# Patient Record
Sex: FEMALE | Race: WHITE | URBAN - METROPOLITAN AREA
[De-identification: names, ages, dates, MRNs, and addresses within clinical notes are randomized per-mention and may not be internally consistent; named-entity substitution may affect disease eponyms.]

---

## 2019-11-13 ENCOUNTER — OFFICE VISIT (OUTPATIENT)
Dept: SURGERY | Age: 54
End: 2019-11-13

## 2019-11-13 VITALS — HEIGHT: 69 IN | WEIGHT: 281 LBS | BODY MASS INDEX: 41.62 KG/M2

## 2019-11-13 DIAGNOSIS — R23.4 BREAST SKIN CHANGES: Primary | ICD-10-CM

## 2019-11-13 RX ORDER — LISINOPRIL 20 MG/1
TABLET ORAL
Refills: 0 | COMMUNITY
Start: 2019-11-07

## 2019-11-13 RX ORDER — IPRATROPIUM BROMIDE AND ALBUTEROL SULFATE 2.5; .5 MG/3ML; MG/3ML
SOLUTION RESPIRATORY (INHALATION) AS NEEDED
Refills: 0 | COMMUNITY
Start: 2019-10-10

## 2019-11-13 RX ORDER — ALBUTEROL SULFATE 90 UG/1
AEROSOL, METERED RESPIRATORY (INHALATION)
Refills: 0 | COMMUNITY
Start: 2019-10-10

## 2019-11-13 RX ORDER — CLINDAMYCIN HYDROCHLORIDE 300 MG/1
300 CAPSULE ORAL 3 TIMES DAILY
Qty: 30 CAP | Refills: 0 | Status: SHIPPED | OUTPATIENT
Start: 2019-11-13 | End: 2019-11-23

## 2019-11-13 RX ORDER — FUROSEMIDE 20 MG/1
TABLET ORAL
Refills: 0 | COMMUNITY
Start: 2019-11-07

## 2019-11-13 RX ORDER — IBUPROFEN 600 MG/1
TABLET ORAL
Refills: 0 | COMMUNITY
Start: 2019-11-07

## 2019-11-13 RX ORDER — CHOLECALCIFEROL (VITAMIN D3) 125 MCG
CAPSULE ORAL
Refills: 0 | COMMUNITY
Start: 2019-11-07

## 2019-11-13 NOTE — PROGRESS NOTES
HISTORY OF PRESENT ILLNESS  Codie Oh is a 49 y.o. female. HPI NEW patient consult referred by  Dr. Anish Erickson for LEFT breast mass. Patient found lump LEFT breast approximately a year ago, causing pain intermittently, originally opened and drained and since then it comes and goes, but no further draining. Recently, after 10/16/19 mammogram, it became swollen, painful and bruised. Denies any nipple discharge/inversion.     OB History   Obstetric Comments   Menarche 16, LMP age 29-hysterectomy, # of children none, age of 1st delivery n/a, Hysterectomy/oophorectomy yes/yes, Breast bx no, history of breast feeding n/a, BCP no, Hormone therapy no       Family History:  Paternal aunt, breast cancer, age approx 61,  from other cause      Imaging at [facility]:  Mammogram, 10/16/19, BILATERAL dx and LEFT breast U/S, BIRADS 2    Past Medical History:   Diagnosis Date    Arthritis     Asthma     Hypertension      Past Surgical History:   Procedure Laterality Date    BREAST SURGERY PROCEDURE UNLISTED      cyst removed from LEFT breast    HX HYSTERECTOMY      both ovaries removed    HX OTHER SURGICAL      cyst removed from anterior neck     Social History     Socioeconomic History    Marital status:      Spouse name: Not on file    Number of children: Not on file    Years of education: Not on file    Highest education level: Not on file   Occupational History    Not on file   Social Needs    Financial resource strain: Not on file    Food insecurity:     Worry: Not on file     Inability: Not on file    Transportation needs:     Medical: Not on file     Non-medical: Not on file   Tobacco Use    Smoking status: Never Smoker    Smokeless tobacco: Never Used   Substance and Sexual Activity    Alcohol use: Never     Frequency: Never    Drug use: Not on file    Sexual activity: Not on file   Lifestyle    Physical activity:     Days per week: Not on file     Minutes per session: Not on file    Stress: Not on file   Relationships    Social connections:     Talks on phone: Not on file     Gets together: Not on file     Attends Sikhism service: Not on file     Active member of club or organization: Not on file     Attends meetings of clubs or organizations: Not on file     Relationship status: Not on file    Intimate partner violence:     Fear of current or ex partner: Not on file     Emotionally abused: Not on file     Physically abused: Not on file     Forced sexual activity: Not on file   Other Topics Concern    Not on file   Social History Narrative    Not on file     OB History    None      Obstetric Comments   Menarche 16, LMP age 29-hysterectomy, # of children none, age of 1st delivery n/a, Hysterectomy/oophorectomy yes/yes, Breast bx no, history of breast feeding n/a, BCP no, Hormone therapy no             Current Outpatient Medications:     albuterol-ipratropium (DUO-NEB) 2.5 mg-0.5 mg/3 ml nebu, as needed. , Disp: , Rfl: 0    albuterol (PROVENTIL HFA, VENTOLIN HFA, PROAIR HFA) 90 mcg/actuation inhaler, inhale 2 puffs by mouth and INTO THE LUNGS every 6 hours if needed for wheezing, Disp: , Rfl: 0    lisinopril (PRINIVIL, ZESTRIL) 20 mg tablet, take 1 tablet by mouth once daily, Disp: , Rfl: 0    furosemide (LASIX) 20 mg tablet, take 1 tablet by mouth once daily, Disp: , Rfl: 0    cholecalciferol, vitamin D3, 2,000 unit tab, take 1 tablet by mouth once daily, Disp: , Rfl: 0    ibuprofen (MOTRIN) 600 mg tablet, take 1 tablet by mouth every 8 hours if needed for moderate pain, Disp: , Rfl: 0  Allergies   Allergen Reactions    Bee Venom Protein (Honey Bee) Anaphylaxis    Morphine Anaphylaxis    Aloe Vera Other (comments)     blisters    Dimetapp Cold-Allergy [Brompheniramine-Phenylephrine] Swelling    Other Medication Swelling     States \"all birth control caused by feet to swell up and bust open\"     Review of Systems   Musculoskeletal: Positive for joint pain. Endo/Heme/Allergies: Bruises/bleeds easily. All other systems reviewed and are negative. Physical Exam   Constitutional: She is oriented to person, place, and time. She appears well-developed and well-nourished. HENT:   Head: Normocephalic. Eyes: EOM are normal.   Neck: Neck supple. No thyromegaly present. Cardiovascular: Intact distal pulses. Pulmonary/Chest: Effort normal. Left breast exhibits skin change (ecchymosis medial left breast no mass) and tenderness. Abdominal: Soft. Musculoskeletal: Normal range of motion. Lymphadenopathy:     She has no cervical adenopathy. Neurological: She is alert and oriented to person, place, and time. Skin: Skin is warm and dry. Psychiatric: She has a normal mood and affect. Nursing note and vitals reviewed. ASSESSMENT and PLAN    ICD-10-CM ICD-9-CM    1. Breast skin changes R23.4 782.8      - no sebaceous cyst today on exam  Looks traumatic.    Patient states breast is redder than normal and warm  Will do antibiotics see her back in 2 weeks

## 2019-11-13 NOTE — COMMUNICATION BODY
HISTORY OF PRESENT ILLNESS  Claire León is a 49 y.o. female. HPI NEW patient consult referred by  Dr. Juanita Kawasaki for LEFT breast mass. Patient found lump LEFT breast approximately a year ago, causing pain intermittently, originally opened and drained and since then it comes and goes, but no further draining. Recently, after 10/16/19 mammogram, it became swollen, painful and bruised. Denies any nipple discharge/inversion.     OB History   Obstetric Comments   Menarche 16, LMP age 29-hysterectomy, # of children none, age of 1st delivery n/a, Hysterectomy/oophorectomy yes/yes, Breast bx no, history of breast feeding n/a, BCP no, Hormone therapy no       Family History:  Paternal aunt, breast cancer, age approx 61,  from other cause      [facility]:  Mammogram, 10/16/19, BILATERAL dx and LEFT breast U/S, BIRADS 2    Past Medical History:   Diagnosis Date    Arthritis     Asthma     Hypertension      Past Surgical History:   Procedure Laterality Date    BREAST SURGERY PROCEDURE UNLISTED      cyst removed from LEFT breast    HX HYSTERECTOMY      both ovaries removed    HX OTHER SURGICAL      cyst removed from anterior neck     Social History     Socioeconomic History    Marital status:      Spouse name: Not on file    Number of children: Not on file    Years of education: Not on file    Highest education level: Not on file   Occupational History    Not on file   Social Needs    Financial resource strain: Not on file    Food insecurity:     Worry: Not on file     Inability: Not on file    Transportation needs:     Medical: Not on file     Non-medical: Not on file   Tobacco Use    Smoking status: Never Smoker    Smokeless tobacco: Never Used   Substance and Sexual Activity    Alcohol use: Never     Frequency: Never    Drug use: Not on file    Sexual activity: Not on file   Lifestyle    Physical activity:     Days per week: Not on file     Minutes per session: Not on file    Stress: Not on file   Relationships    Social connections:     Talks on phone: Not on file     Gets together: Not on file     Attends Congregation service: Not on file     Active member of club or organization: Not on file     Attends meetings of clubs or organizations: Not on file     Relationship status: Not on file    Intimate partner violence:     Fear of current or ex partner: Not on file     Emotionally abused: Not on file     Physically abused: Not on file     Forced sexual activity: Not on file   Other Topics Concern    Not on file   Social History Narrative    Not on file     OB History    None      Obstetric Comments   Menarche 16, LMP age 29-hysterectomy, # of children none, age of 1st delivery n/a, Hysterectomy/oophorectomy yes/yes, Breast bx no, history of breast feeding n/a, BCP no, Hormone therapy no             Current Outpatient Medications:     albuterol-ipratropium (DUO-NEB) 2.5 mg-0.5 mg/3 ml nebu, as needed. , Disp: , Rfl: 0    albuterol (PROVENTIL HFA, VENTOLIN HFA, PROAIR HFA) 90 mcg/actuation inhaler, inhale 2 puffs by mouth and INTO THE LUNGS every 6 hours if needed for wheezing, Disp: , Rfl: 0    lisinopril (PRINIVIL, ZESTRIL) 20 mg tablet, take 1 tablet by mouth once daily, Disp: , Rfl: 0    furosemide (LASIX) 20 mg tablet, take 1 tablet by mouth once daily, Disp: , Rfl: 0    cholecalciferol, vitamin D3, 2,000 unit tab, take 1 tablet by mouth once daily, Disp: , Rfl: 0    ibuprofen (MOTRIN) 600 mg tablet, take 1 tablet by mouth every 8 hours if needed for moderate pain, Disp: , Rfl: 0  Allergies   Allergen Reactions    Bee Venom Protein (Honey Bee) Anaphylaxis    Morphine Anaphylaxis    Aloe Vera Other (comments)     blisters    Dimetapp Cold-Allergy [Brompheniramine-Phenylephrine] Swelling    Other Medication Swelling     States \"all birth control caused by feet to swell up and bust open\"     Review of Systems   Musculoskeletal: Positive for joint pain. Endo/Heme/Allergies: Bruises/bleeds easily. All other systems reviewed and are negative. Physical Exam   Constitutional: She is oriented to person, place, and time. She appears well-developed and well-nourished. HENT:   Head: Normocephalic. Eyes: EOM are normal.   Neck: Neck supple. No thyromegaly present. Cardiovascular: Intact distal pulses. Pulmonary/Chest: Effort normal. Left breast exhibits skin change (ecchymosis medial left breast no mass) and tenderness. Abdominal: Soft. Musculoskeletal: Normal range of motion. Lymphadenopathy:     She has no cervical adenopathy. Neurological: She is alert and oriented to person, place, and time. Skin: Skin is warm and dry. Psychiatric: She has a normal mood and affect. Nursing note and vitals reviewed. ASSESSMENT and PLAN    ICD-10-CM ICD-9-CM    1. Breast skin changes R23.4 782.8      - no sebaceous cyst today on exam  Looks traumatic.    Patient states breast is redder than normal and warm  Will do antibiotics see her back in 2 weeks

## 2019-11-13 NOTE — LETTER
2019 10:30 AM 
 
Patient:  Adam Gambino YOB: 1965 Date of Visit: 2019 Dear Chencho Amaya MD 
[facility] [id number] VIA Facsimile: [phone] 
 : Thank you for referring Ms. Adam Gambino to me for evaluation/treatment. Below are the relevant portions of my assessment and plan of care. HISTORY OF PRESENT ILLNESS Adam Gambino is a 49 y.o. female. HPI NEW patient consult referred by  Dr. Samuel Menard for LEFT breast mass. Patient found lump LEFT breast approximately a year ago, causing pain intermittently, originally opened and drained and since then it comes and goes, but no further draining. Recently, after 10/16/19 mammogram, it became swollen, painful and bruised. Denies any nipple discharge/inversion. OB History Obstetric Comments Menarche 16, LMP age 29-hysterectomy, # of children none, age of 1st delivery n/a, Hysterectomy/oophorectomy yes/yes, Breast bx no, history of breast feeding n/a, BCP no, Hormone therapy no Family History: 
Paternal aunt, breast cancer, age approx 61,  from other cause Imaging at [facility]: 
Mammogram, 10/16/19, BILATERAL dx and LEFT breast U/S, BIRADS 2 Past Medical History:  
Diagnosis Date  Arthritis  Asthma  Hypertension Past Surgical History:  
Procedure Laterality Date  BREAST SURGERY PROCEDURE UNLISTED    
 cyst removed from LEFT breast  
 HX HYSTERECTOMY    
 both ovaries removed  HX OTHER SURGICAL    
 cyst removed from anterior neck Social History Socioeconomic History  Marital status:  Spouse name: Not on file  Number of children: Not on file  Years of education: Not on file  Highest education level: Not on file Occupational History  Not on file Social Needs  Financial resource strain: Not on file  Food insecurity:  
  Worry: Not on file Inability: Not on file  Transportation needs: Medical: Not on file Non-medical: Not on file Tobacco Use  Smoking status: Never Smoker  Smokeless tobacco: Never Used Substance and Sexual Activity  Alcohol use: Never Frequency: Never  Drug use: Not on file  Sexual activity: Not on file Lifestyle  Physical activity:  
  Days per week: Not on file Minutes per session: Not on file  Stress: Not on file Relationships  Social connections:  
  Talks on phone: Not on file Gets together: Not on file Attends Confucianism service: Not on file Active member of club or organization: Not on file Attends meetings of clubs or organizations: Not on file Relationship status: Not on file  Intimate partner violence:  
  Fear of current or ex partner: Not on file Emotionally abused: Not on file Physically abused: Not on file Forced sexual activity: Not on file Other Topics Concern  Not on file Social History Narrative  Not on file OB History None Obstetric Comments Menarche 16, LMP age 29-hysterectomy, # of children none, age of 1st delivery n/a, Hysterectomy/oophorectomy yes/yes, Breast bx no, history of breast feeding n/a, BCP no, Hormone therapy no Current Outpatient Medications:  
  albuterol-ipratropium (DUO-NEB) 2.5 mg-0.5 mg/3 ml nebu, as needed. , Disp: , Rfl: 0 
  albuterol (PROVENTIL HFA, VENTOLIN HFA, PROAIR HFA) 90 mcg/actuation inhaler, inhale 2 puffs by mouth and INTO THE LUNGS every 6 hours if needed for wheezing, Disp: , Rfl: 0 
  lisinopril (PRINIVIL, ZESTRIL) 20 mg tablet, take 1 tablet by mouth once daily, Disp: , Rfl: 0 
  furosemide (LASIX) 20 mg tablet, take 1 tablet by mouth once daily, Disp: , Rfl: 0 
  cholecalciferol, vitamin D3, 2,000 unit tab, take 1 tablet by mouth once daily, Disp: , Rfl: 0 
  ibuprofen (MOTRIN) 600 mg tablet, take 1 tablet by mouth every 8 hours if needed for moderate pain, Disp: , Rfl: 0 Allergies Allergen Reactions  Bee Venom Protein (Honey Bee) Anaphylaxis  Morphine Anaphylaxis  Aloe Vera Other (comments)  
  blisters  Dimetapp Cold-Allergy [Brompheniramine-Phenylephrine] Swelling  Other Medication Swelling States \"all birth control caused by feet to swell up and bust open\" Review of Systems Musculoskeletal: Positive for joint pain. Endo/Heme/Allergies: Bruises/bleeds easily. All other systems reviewed and are negative. Physical Exam  
Constitutional: She is oriented to person, place, and time. She appears well-developed and well-nourished. HENT:  
Head: Normocephalic. Eyes: EOM are normal.  
Neck: Neck supple. No thyromegaly present. Cardiovascular: Intact distal pulses. Pulmonary/Chest: Effort normal. Left breast exhibits skin change (ecchymosis medial left breast no mass) and tenderness. Abdominal: Soft. Musculoskeletal: Normal range of motion. Lymphadenopathy:  
  She has no cervical adenopathy. Neurological: She is alert and oriented to person, place, and time. Skin: Skin is warm and dry. Psychiatric: She has a normal mood and affect. Nursing note and vitals reviewed. ASSESSMENT and PLAN 
  ICD-10-CM ICD-9-CM 1. Breast skin changes R23.4 782.8   
 
- no sebaceous cyst today on exam 
Looks traumatic. Patient states breast is redder than normal and warm Will do antibiotics see her back in 2 weeks If you have questions, please do not hesitate to call me. I look forward to following Ms. Liv Castro along with you. Sincerely, Laurita Lau MD

## 2023-04-06 ENCOUNTER — OFFICE VISIT (OUTPATIENT)
Dept: NEUROLOGY | Age: 58
End: 2023-04-06
Payer: MEDICAID

## 2023-04-06 PROBLEM — K57.92 DIVERTICULITIS: Status: ACTIVE | Noted: 2020-06-09

## 2023-04-06 PROBLEM — D3A.8 NEUROENDOCRINE TUMOR: Status: ACTIVE | Noted: 2021-08-03

## 2023-04-06 PROBLEM — R29.898 LEFT ARM WEAKNESS: Status: ACTIVE | Noted: 2023-04-06

## 2023-04-06 PROBLEM — K57.90 DIVERTICULOSIS: Status: ACTIVE | Noted: 2020-06-09

## 2023-04-06 PROBLEM — Z87.892 HISTORY OF ANAPHYLAXIS: Status: ACTIVE | Noted: 2021-10-20

## 2023-04-06 PROBLEM — Z92.89 HISTORY OF TRANSFUSION: Status: ACTIVE | Noted: 2021-08-25

## 2023-04-06 PROBLEM — I10 BENIGN ESSENTIAL HYPERTENSION: Status: ACTIVE | Noted: 2017-09-20

## 2023-04-06 PROBLEM — G43.019 INTRACTABLE MIGRAINE WITHOUT AURA AND WITHOUT STATUS MIGRAINOSUS: Status: ACTIVE | Noted: 2023-04-06

## 2023-04-06 PROBLEM — R56.9 SEIZURE-LIKE ACTIVITY (HCC): Status: ACTIVE | Noted: 2023-04-06

## 2023-04-06 PROBLEM — E66.01 MORBID OBESITY (HCC): Status: ACTIVE | Noted: 2020-06-22

## 2023-04-06 PROBLEM — G45.9 TIA (TRANSIENT ISCHEMIC ATTACK): Status: ACTIVE | Noted: 2023-04-06

## 2023-04-06 PROBLEM — K80.20 CALCULUS OF GALLBLADDER: Status: ACTIVE | Noted: 2021-08-12

## 2023-04-06 PROBLEM — K63.89 MESENTERIC MASS: Status: ACTIVE | Noted: 2021-08-12

## 2023-04-06 PROBLEM — M19.90 ARTHRITIS: Status: ACTIVE | Noted: 2017-05-10

## 2023-04-06 PROBLEM — M10.9 GOUT: Status: ACTIVE | Noted: 2019-10-09

## 2023-04-06 PROBLEM — C7A.8 PRIMARY MALIGNANT NEUROENDOCRINE TUMOR OF SMALL INTESTINE (HCC): Status: ACTIVE | Noted: 2021-10-01

## 2023-04-06 PROBLEM — K66.9: Status: ACTIVE | Noted: 2021-09-12

## 2023-04-06 PROCEDURE — 3078F DIAST BP <80 MM HG: CPT | Performed by: PSYCHIATRY & NEUROLOGY

## 2023-04-06 PROCEDURE — 99205 OFFICE O/P NEW HI 60 MIN: CPT | Performed by: PSYCHIATRY & NEUROLOGY

## 2023-04-06 PROCEDURE — 3077F SYST BP >= 140 MM HG: CPT | Performed by: PSYCHIATRY & NEUROLOGY

## 2023-04-06 NOTE — PROGRESS NOTES
Chief Complaint   Patient presents with    New Patient     Patient has cancer and has had tia's   also has migraines. Aug 25 of 2021 cancer dx. Pressure on the top of the head after surgeries and cannot hold anything in left hand and loses site in the left eye.

## 2023-04-06 NOTE — ASSESSMENT & PLAN NOTE
Presents like focal sensorimotor presentation we will check EEG and MRI scan  Unclear etiology possible migraine variant versus seizure versus TIA versus other

## 2023-04-06 NOTE — PATIENT INSTRUCTIONS
As per discussion we are not really sure what your symptoms are could be complicated migraine versus seizure versus TIA    We are not can make any changes in your treatments or interventions at this time we are going to order multiple tests and I will go over those results with you via "[employer] and anything differently we need to do based on those results and we can talk about them in more detail when I see you back in the office if needed    If your symptoms progress or change from baseline you will need to go back to the ED for evaluation but as long as your symptoms continue to be consistent you do not need to go to the emergency room      Office Policies      Appointments  Please make sure that you arrive for your next appointment at least 15 minutes prior to your appointment time. If for some reason you are going to be late please notify the office to determine if you need to be rescheduled or we can adjust your appointment time      Phone calls/patient messages:  Please allow up to 24 hours for someone in the office to contact you about your call or message. Be mindful your provider may be out of the office or your message may require further review. We encourage you to use "[employer] for your messages as this is a faster, more efficient way to communicate with our office    Medication Refills:  Prescription medications require up to 48 business hours to process. We encourage you to use "[employer] for your refills. For controlled medications: Please allow up to 72 business hours to process. Certain medications may require you to  a written prescription at our office. NO narcotic/controlled medications will be prescribed after 4pm Monday through Friday or on weekends    Form/Paperwork Completion:  We ask that you allow 7-14 business days. You may also download your forms to "[employer] to have your doctor print off.

## 2023-04-06 NOTE — ASSESSMENT & PLAN NOTE
Unclear etiology  Checking MRI of the brain to evaluate for focal lesion  Check EMG for more peripheral pathology  May need MRI of the cervical spine but will wait on results of above before making that determination

## 2023-04-06 NOTE — ASSESSMENT & PLAN NOTE
Spells that has been treated like TIAs but true unclear etiology: TIA versus seizure versus atypical migraine or complicated migraine versus other  Agree with being on a baby aspirin a day  Repeat MRI scan  Check CTA head neck  Echo ordered    Patient has been advised if she has any further breakthrough that is prolonged or with increased symptomatology to go to the ED for further evaluation

## 2023-04-06 NOTE — ASSESSMENT & PLAN NOTE
Migraine presentation is typical and different from these other spells  For now agree with low-dose sumatriptan as needed  Would like to wait to get work-up completed but at that point we can look to start her on preventative therapy I most likely would use Topamax as preventative therapy    For now continue on low-dose sumatriptan unless there is specific pathology for cerebrovascular disease

## 2023-04-06 NOTE — LETTER
4/6/2023    Patient: Brandon Ordonez   YOB: 1965   Date of Visit: 4/6/2023     Onur Paiz NP  [id number] [facility] [address]  Via Fax: [phone]    Dear Onur Paiz NP,      Thank you for referring Ms. Brandon Ordonez to [address] [place] [address] for evaluation. My notes for this consultation are attached. If you have questions, please do not hesitate to call me. I look forward to following your patient along with you.       Sincerely,    Maged Bowden, ANP-C

## 2023-04-06 NOTE — PROGRESS NOTES
[facility]  In Office NEW Pt VISIT         Ranjeet Chowdary is a 65 y.o. female who presents today for the following:  Chief Complaint   Patient presents with    New Patient     Patient has cancer and has had tia's   also has migraines. Aug 25 of 2021 cancer dx. Pressure on the top of the head after surgeries and cannot hold anything in left hand and loses site in the left eye. ASSESSMENT AND PLAN    1. TIA (transient ischemic attack)  Assessment & Plan:   Spells that has been treated like TIAs but true unclear etiology: TIA versus seizure versus atypical migraine or complicated migraine versus other  Agree with being on a baby aspirin a day  Repeat MRI scan  Check CTA head neck  Echo ordered    Patient has been advised if she has any further breakthrough that is prolonged or with increased symptomatology to go to the ED for further evaluation  Orders:  -     MRI BRAIN W WO CONT; Future  -     CTA HEAD NECK W CONT; Future  -     ECHO ADULT COMPLETE; Future  2. Left arm weakness  Assessment & Plan:   Unclear etiology  Checking MRI of the brain to evaluate for focal lesion  Check EMG for more peripheral pathology  May need MRI of the cervical spine but will wait on results of above before making that determination  Orders:  -     REFERRAL TO [facility]  3. Seizure-like activity ([facility] [address]  Assessment & Plan:  Presents like focal sensorimotor presentation we will check EEG and MRI scan  Unclear etiology possible migraine variant versus seizure versus TIA versus other  Orders:  -     MRI BRAIN W WO CONT; Future  -     NEURO EEG 24 HR; Future  4. 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  Intractable migraine without aura and without status migrainosus  Assessment & Plan:  Migraine presentation is typical and different from these other spells  For now agree with low-dose sumatriptan as needed  Would like to wait to get work-up completed but at that point we can look to start her on preventative therapy I most likely would use Topamax as preventative therapy    For now continue on low-dose sumatriptan unless there is specific pathology for cerebrovascular disease          Patient and/or family was given time to ask questions and voice concerns. I believe all questions concerns were adequately addressed at this  office visit. Patient and/or family also verbalized agreement and understanding of the above-stated plan    Complex neurologic decision making secondary any or all of the following to include unclear etiology, and /or polypharmacy, and/or significant comorbid conditions, and/or use of high-risk medications which complicate the decision making process related to patient's neurologic diagnosis      ICD-10-CM ICD-9-CM    1. TIA (transient ischemic attack)  G45.9 435.9 MRI BRAIN W WO CONT      CTA HEAD NECK W CONT      ECHO ADULT COMPLETE      2. Left arm weakness  R29.898 729.89 REFERRAL TO NEUROLOGY IN CLINIC PROCEDURES      3. Seizure-like activity (HCC)  R56.9 780.39 MRI BRAIN W WO CONT      NEURO EEG 24 HR      4. 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 Intractable migraine without aura and without status migrainosus  G43.019 346.11             I attest that 60 minutes was spent on today's visit reviewing medical records and diagnostic testing deemed pertinent to this patient's care, along with direct time spent at patient's visit including the history, physical assessment and plan, discussing diagnosis and management along with documentation.       HPI    Here with spouse mick    Patient was referred to the practice for the following reason[s]:  Migraines and TIA    Patient is accompanied by:  Teresa Tompkins  History is obtained predominantly by: Mainly from patient and review of medical records        TIAs/ spells  Started around July 2022 after surgery for cancer  Pressure in vertex and blurred sight Left eye and Left hand shakes  Initial frequency: 3x/week but more recently 4 x/month  Duration: 30 to 45 seconds  Feels tired for about an hour after the event and processing a bit slowed during that time      Migraines  Onset childhood  Location: holocephalic  Frequency 2x/month on average  Common migraine presentation: Pounding throbbing nausea no vomiting light and sound sensitivity  Duration: Up to a week but usually 48 hours  Severity: 10/10 and usually has to lay down in a dark quiet room  Trigger: chocolate, strong smells    Preventative  Never on any preventative    Rescue:   Imitrex 50 mg prn and works well if \"catches it on time\"        Other significant comorbid conditions/concerns  Primary malignant neuroendocrine tumor of the small intestine followed at [facility] at [address] [place] [address]   Diagnosed 6/29/2021 staging diagnosis pT3 pN2 M0   Status post resection small bowel   Baseline persistent diarrhea    Arthritis  Asthma  Status post hysterectomy 2003  Reflux  Hypertension      Pertinent diagnostic data      No results found for this or any previous visit. MRI head at [facility] 7/26/2022 for possible TIA  Impressions   07/27/2022 4:41 PM EDT    Normal unenhanced and enhanced MRI of the brain    Dictated By: Jazmin Jean   Electronically Verified by: Jazmin Jean 7/27/2022 4:41 PM           Imaging Results - MR head w and wo IV contrast (07/26/2022 1:22 PM EDT)  Narrative   07/27/2022 4:41 PM EDT      STUDY: MRI OF THE BRAIN WITHOUT AND WITH IV CONTRAST     HISTORY: G45.9: Transient cerebral ischemic attack, unspecified  Reason for exam: Recurrent TIAs, eval for acute process.     TECHNIQUE: Multisequence, multiplanar MR imaging was performed through the head prior to and following the administration of IV contrast material.     COMPARISON: None    FINDINGS:   There is no abnormal signal intensity in the brain parenchyma. The size, shape and configuration of ventricles and sulci are unremarkable. There is no mass, mass-effect, or midline shift. There is no acute intracranial hemorrhage or abnormal extra-axial fluid collection. There is no diffusion restriction. There are normal T2 flow voids in the larger intracranial vessels. There is no abnormal brain parenchymal or leptomeningeal enhancement. The orbits are grossly unremarkable. The paranasal sinuses and mastoid air cells are clear. The bone marrow signal pattern is normal.           Allergies   Allergen Reactions    Bee Venom Protein (Honey Bee) Anaphylaxis    Morphine Anaphylaxis    Aloe Vera Other (comments)     blisters    Celecoxib Rash     Reaction Type: Allergy      Dimetapp Cold-Allergy [Brompheniramine-Phenylephrine] Swelling    Lisinopril Other (comments)     Severe cough    Other Medication Swelling     States \"all birth control caused by feet to swell up and bust open\"       Current Outpatient Medications   Medication Sig    amLODIPine (NORVASC) 5 mg tablet Take 1 Tablet by mouth daily. aspirin 81 mg chewable tablet Take 1 Tablet by mouth daily. cyclobenzaprine (FLEXERIL) 10 mg tablet TAKE 1/2 TABLET BY MOUTH THREE TIMES DAILY AS NEEDED FOR UP TO 14 DAYS    diphenoxylate-atropine (LOMOTIL) 2.5-0.025 mg per tablet Take 1 Tablet by mouth. EPINEPHrine (AUVI-Q) 0.15 mg/0.15 mL injection     fluticasone propionate (FLOVENT HFA) 110 mcg/actuation inhaler Take 2 Puffs by inhalation daily. fluticasone propionate (FLONASE) 50 mcg/actuation nasal spray SHAKE LIQUID AND USE 2 SPRAYS IN EACH NOSTRIL DAILY    loperamide (IMMODIUM) 2 mg tablet     Omeprazole delayed release (PRILOSEC D/R) 20 mg tablet Take 1 Tablet by mouth daily.     ondansetron (ZOFRAN ODT) 4 mg disintegrating tablet DISSOLVE 1 TABLET IN MOUTH EVERY 8 HOURS AS NEEDED FOR NAUSEA AND VOMITING    SUMAtriptan (IMITREX) 50 mg tablet Take 1 Tablet by mouth daily as needed. furosemide (LASIX) 20 mg tablet take 1 tablet by mouth once daily    cholecalciferol, vitamin D3, 2,000 unit tab take 1 tablet by mouth once daily    ibuprofen (MOTRIN) 600 mg tablet take 1 tablet by mouth every 8 hours if needed for moderate pain    albuterol-ipratropium (DUO-NEB) 2.5 mg-0.5 mg/3 ml nebu as needed. albuterol (PROVENTIL HFA, VENTOLIN HFA, PROAIR HFA) 90 mcg/actuation inhaler inhale 2 puffs by mouth and INTO THE LUNGS every 6 hours if needed for wheezing     No current facility-administered medications for this visit. Past medical history/surgical history, family history, and social history have been reviewed for today's visit      ROS    A ten system review of constitutional, cardiovascular, respiratory, musculoskeletal, endocrine, skin, SHEENT, genitourinary, psychiatric and neurologic systems was obtained and is unremarkable except as mentioned under HPI          EXAMINATION:     Visit Vitals  BP (!) 148/72 (BP 1 Location: Left upper arm, BP Patient Position: Sitting, BP Cuff Size: Large adult)   Pulse 79   Temp 97.5 °F (36.4 °C) (Temporal)   Resp 16   Ht 5' 9\" (1.753 m)   Wt 273 lb 12.8 oz (124.2 kg)   SpO2 98%   BMI 40.43 kg/m²         General appearance: Patient is well-developed and well-nourished in no apparent distress and well groomed.     Psych/mental health:  Affect: Appropriate    PHQ  3 most recent PHQ Screens 4/6/2023   Little interest or pleasure in doing things Not at all   Feeling down, depressed, irritable, or hopeless Not at all   Total Score PHQ 2 0       HEENT:   Normocephalic  With evidence of trauma: No  Full range of motion head neck: Yes  Tenderness to palpation of the head neck region: No      Cardiovascular:     Extremities warm to touch:Yes  Extremity swelling: No  Discoloration: No  Evidence of PVD: No    Respiratory:   Dyspnea on exertion: No   Abnormal effort on casual observation: No   Use of portable oxygen: No   Evidence of cyanosis: No     Musculoskeletal:   Evidence of significant bone deformities: No   Spinal curvature: No     Integumentary:    Obvious bruising: No   Lacerations or discoloration on casual observation: No       Neurological Examination:   Mental Status:        MMSE  No flowsheet data found. Formal testing was not completed    there was nothing concerning on general observation and discussion. Alert oriented and appropriate to general conversation  Normal processing on general observation  Followed conversation and responded seemingly appropriate throughout the office visit  No word finding difficulties noted on casual observation  Able to follow directions without difficulty     Cranial Nerves:    EOMs intact gaze is conjugate  No nystagmus is appreciated  Facial motor intact bilaterally  Hearing intact to conversation  Voice with normal projection, no evidence of secretion pooling  Shoulder shrug intact bilaterally  No tongue deviation appreciated     Motor:   Normal bulk  No tremor appreciated on today's exam  No abnormal movements appreciated on today's exam  Moves extremities spontaneously and with purpose  5/5 x 3  Left upper extremity:   Deltoid: 5/5   Biceps: 4/5   Triceps: 4/5   Handgrips: 4/5   Finger extension: 4/5   Finger abduction: 4/5          Sensation: Intact to light touch and vibration at the great toes bilaterally    Coordination/Cerebellar:   FTN: Intact bilaterally, Romberg negative    Gait: Ambulates independently    Reflexes: +2 except at the ankles which are +1 bilaterally, toes are downgoing bilaterally     Fall risk assessment  No flowsheet data found. Follow-up and Dispositions    Return in about 6 months (around 10/6/2023) for In office appointment.            Odell Portillo MS, ANP-BC, [facility]

## 2023-04-06 NOTE — ASSESSMENT & PLAN NOTE
Recent metastasis to the left breast via PET scan  We will repeat MRI scan to evaluate for metastasis

## 2023-04-29 DIAGNOSIS — G45.9 INTERMITTENT CEREBRAL ISCHEMIA: Primary | ICD-10-CM
